# Patient Record
Sex: MALE | Race: BLACK OR AFRICAN AMERICAN
[De-identification: names, ages, dates, MRNs, and addresses within clinical notes are randomized per-mention and may not be internally consistent; named-entity substitution may affect disease eponyms.]

---

## 2021-11-18 LAB
BUN SERPL-MCNC: 21 MG/DL (ref 9–20)
BUN/CREAT SERPL: 35 %
CALCIUM SERPL-MCNC: 9 MG/DL (ref 8.4–10.2)
HCT VFR BLD CALC: 44.5 % (ref 35.5–45.6)
HEMOLYSIS INDEX: 6
HGB BLD-MCNC: 14.4 GM/DL (ref 11.8–15.2)
MCHC RBC AUTO-ENTMCNC: 32 % (ref 32–34)
MCV RBC AUTO: 91 FL (ref 84–94)
PLATELET # BLD: 252 K/MM3 (ref 140–440)
RBC # BLD AUTO: 4.88 M/MM3 (ref 3.65–5.03)

## 2021-11-18 NOTE — ANESTHESIA CONSULTATION
Anesthesia Consult and Med Hx


Date of service: 11/23/21





- Airway


Anesthetic Teeth Evaluation: Dentures, Edentulous


ROM Head & Neck: Adequate


Mental/Hyoid Distance: Adequate


Mallampati Class: Class II


Intubation Access Assessment: Good





- Pre-Operative Health Status


ASA Pre-Surgery Classification: ASA2


Proposed Anesthetic Plan: General





- Pulmonary


Hx Smoking: No


Hx Respiratory Symptoms: No


Hx Sleep Apnea: No (AMANDA PRE SCREEN LOW RISK)





- Cardiovascular System


Hx Hypertension: No





- Central Nervous System


Hx Back Pain: Yes (NECK PAIN)


Hx Psychiatric Problems: No





- Hematic


Hx Anemia: No





- Other Systems


Hx Cancer: No


Hx Obesity: No





- Additional Comments


Anesthesia Medical History Comments: Latvian speaking only; used language 

line

## 2021-11-19 NOTE — ELECTROCARDIOGRAPH REPORT
[facility]

                                       

Test Date:    2021               Test Time:    09:57:16

Pat Name:     LOREN HENDRICKS               Department:   

Patient ID:   [id number]          Room:          

Gender:       M                        Technician:   ROSA

:          1950               Requested By: CLIVE GUERIN

Order Number: [id number]              Reading MD:   Geoff Ag

                                 Measurements

Intervals                              Axis          

Rate:         71                       P:            67

KY:           156                      QRS:          29

QRSD:         96                       T:            68

QT:           397                                    

QTc:          432                                    

                           Interpretive Statements

Sinus rhythm

Probable left atrial enlargement

No previous ECG available for comparison

Electronically Signed On 2021 9:02:56 EST by Geoff Ag

## 2021-11-23 ENCOUNTER — HOSPITAL ENCOUNTER (OUTPATIENT)
Dept: HOSPITAL 5 - OR | Age: 71
Discharge: HOME | End: 2021-11-23
Attending: SURGERY
Payer: MEDICARE

## 2021-11-23 VITALS — SYSTOLIC BLOOD PRESSURE: 130 MMHG | DIASTOLIC BLOOD PRESSURE: 71 MMHG

## 2021-11-23 DIAGNOSIS — Z79.899: ICD-10-CM

## 2021-11-23 DIAGNOSIS — Z20.822: ICD-10-CM

## 2021-11-23 DIAGNOSIS — K40.20: Primary | ICD-10-CM

## 2021-11-23 DIAGNOSIS — Z98.890: ICD-10-CM

## 2021-11-23 DIAGNOSIS — N40.0: ICD-10-CM

## 2021-11-23 PROCEDURE — U0003 INFECTIOUS AGENT DETECTION BY NUCLEIC ACID (DNA OR RNA); SEVERE ACUTE RESPIRATORY SYNDROME CORONAVIRUS 2 (SARS-COV-2) (CORONAVIRUS DISEASE [COVID-19]), AMPLIFIED PROBE TECHNIQUE, MAKING USE OF HIGH THROUGHPUT TECHNOLOGIES AS DESCRIBED BY CMS-2020-01-R: HCPCS

## 2021-11-23 PROCEDURE — C1781 MESH (IMPLANTABLE): HCPCS

## 2021-11-23 PROCEDURE — 36415 COLL VENOUS BLD VENIPUNCTURE: CPT

## 2021-11-23 PROCEDURE — 80048 BASIC METABOLIC PNL TOTAL CA: CPT

## 2021-11-23 PROCEDURE — 49650 LAP ING HERNIA REPAIR INIT: CPT

## 2021-11-23 PROCEDURE — 93005 ELECTROCARDIOGRAM TRACING: CPT

## 2021-11-23 PROCEDURE — 85027 COMPLETE CBC AUTOMATED: CPT

## 2021-11-23 NOTE — SHORT STAY SUMMARY
Short Stay Documentation


Date of service: 11/23/21





- History


Principal diagnosis: bilateral inguinal hernia


H&P: obtained from office





- Allergies and Medications


Current Medications: 


                                    Allergies





No Known Allergies Allergy (Verified 11/16/21 14:07)


   





                                Home Medications











 Medication  Instructions  Recorded  Confirmed  Last Taken  Type


 


Ascorbic Acid [Vitamin C] 1,000 mg PO DAILY 11/16/21 11/16/21 11/22/21 History


 


Cholecalciferol (Vitamin D3) 2,000 unit PO QDAY 11/16/21 11/16/21 11/22/21 

History





[Vitamin D3 2,000 UNIT CAP]     


 


Omega-3 Fatty Acids/Fish Oil [Fish 1 each PO DAILY 11/16/21 11/16/21 11/22/21 H

istory





Oil 1,000 mg Capsule]     


 


Tamsulosin [Flomax] 0.4 mg PO QDAY 11/16/21 11/16/21 11/22/21 History


 


Vitamin B Complex [Balanced B-50] 1 each PO DAILY 11/16/21 11/16/21 11/22/21 

History








Active Medications





Acetaminophen (Acetaminophen 325 Mg Tab)  650 mg PO ONCE PAWEL


   Stop: 11/23/21 23:01


   Last Admin: 11/23/21 06:57 Dose:  650 mg


   Documented by: 


Hydrocodone Bitart/Acetaminophen (Hydrocodone/Acetaminophen 5-325 Mg Tab)  2 

each PO ONCE PRN


   PRN Reason: Pain, Moderate (4-6)


   Stop: 11/23/21 18:00


Gabapentin (Gabapentin 100 Mg Cap)  100 mg PO PREOP PAWEL


   Stop: 11/23/21 23:59


   Last Admin: 11/23/21 06:57 Dose:  100 mg


   Documented by: 


Hydromorphone HCl (Hydromorphone 1 Mg/1 Ml Inj)  0.5 mg IV Q10MIN PRN


   PRN Reason: Pain , Severe (7-10)


   Stop: 11/23/21 17:00


Cefazolin Sodium (Ancef/Sterile Water 2 Gm/20 Ml)  2 gm in 20 mls @ 80 mls/hr IV

PREOP NR; Protocol


   Stop: 11/23/21 23:59


Lactated Ringer's (Lactated Ringers)  1,000 mls @ 100 mls/hr IV AS DIRECT PAWEL


   Last Admin: 11/23/21 06:50 Dose:  100 mls/hr


   Documented by: 


Ondansetron HCl (Ondansetron 4 Mg/2 Ml Inj)  4 mg IV ONCE PRN


   PRN Reason: Nausea And Vomiting


   Stop: 11/23/21 17:00











- Brief post op/procedure progress note


Date of procedure: 11/23/21


Pre-op diagnosis:  bilateral inguinal hernia


Post-op diagnosis: same


Procedure: 





robotic assisted bilateral inguinal hernia repair with mesh, b/l ilioinguinal 

nerve block


Anesthesia: GETA, local, other (b/l ilioinguinal nerve block)


Findings: 





Large direct bilateral inguinal hernias - left side containing unobstructed 

small bowel which was easily reduced


Surgeon: CLIVE GUERIN


Assistant: BING PEREZ


Estimated blood loss: minimal


Pathology: none


Condition: stable





- Hospital course


Hospital course: 





Pt observed in PACU and discharged to home in stable condition





- Disposition


Condition at discharge: Good


Disposition: 01 HOME / SELF CARE / HOMELESS





Short Stay Discharge Plan


Activity: other (NO HEAVY LIFTING OF GREATER THAN 15 LBS FOR NEXT 6 WEEKS)


Diet: regular


Wound: open to air, per your surgeon's advice


Additional Instructions: 


SEE PRINTED DISCHARGE INSTRUCTIONS


Follow up with: 


CHAN MCCALLUM MD [Primary Care Provider] - 7 Days


CLIVE GUERIN DO [Staff Physician] - 14 Days


Prescriptions: 


Gabapentin 200 mg PO BID #6 capsule


Ibuprofen [Motrin 800 MG tab] 800 mg PO Q8HR PRN #30 tablet


 PRN Reason: Pain, Moderate (4-6)


HYDROcodone/APAP 5-325 [Norco 5-325 mg TAB] 1 each PO Q6H PRN #20 tablet


 PRN Reason: Pain , Severe (7-10)

## 2021-11-23 NOTE — OPERATIVE REPORT
Operative Report


Operative Report: 





Date of procedure: 11/23/21


Pre-op diagnosis:  bilateral inguinal hernia


Post-op diagnosis: same


Procedure: 





robotic assisted bilateral inguinal hernia repair with mesh, b/l ilioinguinal 

nerve block


Anesthesia: GETA, local, other (b/l ilioinguinal nerve block)


Findings: 


Large direct bilateral inguinal hernias - left side containing unobstructed 

small bowel which was easily reduced


Surgeon: CLIVE GUERIN


Assistant: BING PEREZ


Estimated blood loss: minimal


Pathology: none


Condition: stable


Hospital course: 


Pt observed in PACU and discharged to home in stable condition





HPI and indication: Patient is a 71-year-old male who was referred to the 

surgery clinic for a bulge in the right groin.  He was found to have a bilateral

reducible inguinal hernias on physical exam.  He had a history of primary open 

right inguinal hernia repair 50 years prior. It was recommended that the hernias

be repaired. I discussed all risk, benefits, alternatives to repair with the 

patient and questions were answered using the [facility] [place]  

line. Consent obtained for robotic assisted bilateral inguinal hernia repair 

with mesh, possible open. 





Procedure in detail: Patient was identified in the preoperative area, take back 

to operating room placed on operative table in supine position.  After 

anesthesia was induced both arms were tucked and all bony prominences padded 

appropriately.  A Lanza catheter was sterilely placed by the circulating nurse. 

The abdomen and b/l groins were then prepped and draped in usual sterile fashion

and a timeout performed.  Local anesthetic was infiltrated to skin at the 

intended incision sites.  A supraumbilical incision was made through which a 

Veress needle was inserted.  Veress needle positioning was confirmed using 

saline drop test and the abdomen insufflated to 15 mmHg.  Once the abdomen was 

insufflated, the Veress needle was removed and a 5 mm Optiview trocar was placed

as incision.  The abdomen is inspected there was no underlying injury to any of 

the abdominal structures.  Patient was placed in Trendelenburg and the pelvis 

examined.  There were bilateral inguinal hernias. There was unobstructed small 

bowel in the left inguinal hernia which was gently reduced with external 

pressure. 





At this point, an 8 mm right upper quadrant and left upper quadrant robotic 

trocars were then placed under direct visualization.  The 5 mm supraumbilical 

trocar was removed and replaced with a 12 mm balloon trocar under direct 

visualization.  A Ray-Katiuska was placed into the abdomen.  The robot was then 

docked.  A fenestrated bipolar was placed into arm #2 and a monopolar scissor in

arm #1.  The surgeon was then transferred to the console.





First, I created a left sided preperitoneal flap.  The peritoneum was scored 

approximately 5 to 6 cm from the hernia defect.  The peritoneum was then incised

from the midline to the ASIS.  The preperitoneal flap was then developed in an 

avascular plane.  I first defined the medial margin by dissecting to the pubic 

tubercle.  The pubic tubercle was cleared of overlying fatty tissue using blunt 

dissection.  I then created the lateral margin in a similar fashion.  Great care

was taken to avoid injury to any nerves. There was a direct inguinal hernia and 

the hernia sac was gently reduced using blunt dissection and transecting 

cremasteric fibers with electrocautery. During the dissection, the cord 

structures were identified and protected.  The cord structures and vas deferens 

were visualized throughout the entire dissection.  Once the hernia sac was 

completely reduced, the peritoneal flap was checked for hemostasis.  Any 

additional cremasteric fibers that were were tenting up the peritoneum were 

divided.  Hemostasis was carefully ensured. 





The right direct inguinal hernia was dissected in the same fashion as the left. 

The hernia sac was very thin and multiple defects were created during reduction.

 The peritoneal flaps were connected and one large flap created.  The left-sided

hernia was repaired with a left large 3D max mesh on the right was repaired with

a large right 3D max mesh.  The mesh was overlapped at the medial border and 

sutured to the pubic tubercle using 2-0 Vicryl interrupted stitches.  The 

lateral portions of the mesh were sutured to the anterior lateral abdominal wall

using 2-0 Vicryl interrupted sutures. The meshes was seen to lay flat in the 

pocket with excellent coverage.  An 18F angiocath was inserted through the 

abdominal wall into the lateral portion of the right flap. The peritoneum was 

then reapproximated using 3-0 running V-Loc stitch x2.  The defects in the 

peritoneum were approximated using 3-0 vloc running stitch. The entirety of the 

mesh was covered with peritoneum.  





The robot was then undocked and the surgeon scrubbed back in.  The remainder of 

the case was performed laparoscopically.





All sharp materials along with a Ray-Katiuska were removed from the abdomen under 

direct visualization.  The 12 mm port was removed and the fascia closed using 

interrupted 0 Vicryl stitch. The remaining ports were removed and any scrotal 

air evacuated through the angiocath. 





Skin incisions were once again infiltrated with local anesthetic.  Bilateral 

ilioinguinal nerve blocks was also performed with 5 cc of local anesthetic each.

 The skin incisions were approximated with 4-0 Monocryl subcuticular stitches 

and skin glue. 





At the end of the case all sponge, instrument, sharp counts were correct x2.





Patient was awoken from anesthesia and Lanza catheter removed.  Both testicles 

were palpated in the scrotum in anatomic position.


The patient was taken to PACU in stable condition.

## 2022-01-03 ENCOUNTER — HOSPITAL ENCOUNTER (OUTPATIENT)
Dept: HOSPITAL 5 - VAS | Age: 72
Discharge: HOME | End: 2022-01-03
Attending: SURGERY
Payer: MEDICARE

## 2022-01-03 DIAGNOSIS — R22.43: Primary | ICD-10-CM

## 2022-01-03 PROCEDURE — 93970 EXTREMITY STUDY: CPT

## 2022-01-03 NOTE — VASCULAR LAB REPORT
DUPLEX DOPPLER LOWER EXTREMITY VEINS, BILATERAL



INDICATION:  R22.43 LOCALIZED SWELLING MASS/LUMP LOWER LIMB BILATERAL.



TECHNIQUE:  Duplex doppler imaging was performed through the veins of both lower extremities using ve
nous compression and other maneuvers.



COMPARISON:  No relevant prior imaging study available.



FINDINGS:

Right Common femoral vein: Negative.

Right Superficial femoral vein: Negative.

Right Popliteal vein: Negative.

Right Calf veins: Negative.



Left Common femoral vein: Negative.

Left Superficial femoral vein: Negative.

Left Popliteal vein: Negative.

Left Calf veins: Negative.



Additional findings: None.



IMPRESSION:

 No sonographic evidence for DVT in either lower extremity.



Signer Name: Jey Magana Jr, MD 

Signed: 1/3/2022 1:37 PM

Workstation Name: [id number]

## 2022-01-05 ENCOUNTER — HOSPITAL ENCOUNTER (OUTPATIENT)
Dept: HOSPITAL 5 - CT | Age: 72
Discharge: HOME | End: 2022-01-05
Attending: SURGERY
Payer: MEDICARE

## 2022-01-05 DIAGNOSIS — M47.816: ICD-10-CM

## 2022-01-05 DIAGNOSIS — N13.30: ICD-10-CM

## 2022-01-05 DIAGNOSIS — N40.0: Primary | ICD-10-CM

## 2022-01-05 DIAGNOSIS — R19.00: ICD-10-CM

## 2022-01-05 PROCEDURE — 74176 CT ABD & PELVIS W/O CONTRAST: CPT

## 2022-01-05 NOTE — CAT SCAN REPORT
CT ABDOMEN AND PELVIS WITHOUT CONTRAST



HISTORY: INTRA-ABDOMINAL   PELVIC SWELLING.



COMPARISON: None.



TECHNIQUE: Helical CT images of the abdomen and pelvis were obtained without administration of intrav
enous contrast. Sagittal and coronal reformatted images were reviewed. All CT scans at this location 
are performed using CT dose reduction for ALARA by means of automated exposure control. 



FINDINGS:



Abdomen/pelvis:  The bladder is markedly distended. No bladder filling defect or wall abnormality is 
detected. The prostate gland is enlarged measuring up to 6.2 cm in diameter. Bladder outlet obstructi
on is suspected. There is mild to moderate bilateral hydronephrosis. No focal renal lesion or nephrol
ithiasis.



The liver, biliary system, pancreas, spleen, adrenal glands and bowel loops are unremarkable on nonco
ntrast CT. The aorta is ectatic with scattered calcifications. No aneurysm. No adenopathy, free fluid
 or free air.



There are rounded fluid collections in both inguinal regions measuring up to 4 cm in diameter. This a
ppears to represent postoperative seromas from pre-peritoneal hernia repairs.



Lungs/bones:  The lung bases are clear. Normal heart size. There is moderate levocurvature of the lum
bar spine with multilevel degenerative changes.





IMPRESSION:

Bladder outlet obstruction with bilateral hydronephrosis. This is likely secondary to an enlarged pro
state gland.

Bilateral inguinal seromas as described above.



These findings were discussed with Dr. Keller at 1320 hours Eastern standard time.



Signer Name: Jey Magana Jr, MD 

Signed: 1/5/2022 1:31 PM

Workstation Name: [id number]